# Patient Record
Sex: FEMALE | Race: WHITE
[De-identification: names, ages, dates, MRNs, and addresses within clinical notes are randomized per-mention and may not be internally consistent; named-entity substitution may affect disease eponyms.]

---

## 2017-02-17 ENCOUNTER — HOSPITAL ENCOUNTER (OUTPATIENT)
Dept: HOSPITAL 45 - C.LABPBG | Age: 82
Discharge: HOME | End: 2017-02-17
Attending: INTERNAL MEDICINE
Payer: COMMERCIAL

## 2017-02-17 DIAGNOSIS — I10: Primary | ICD-10-CM

## 2017-02-17 LAB
ANION GAP SERPL CALC-SCNC: 10 MMOL/L (ref 3–11)
BUN SERPL-MCNC: 23 MG/DL (ref 7–18)
BUN/CREAT SERPL: 18.1 (ref 10–20)
CALCIUM SERPL-MCNC: 9.1 MG/DL (ref 8.5–10.1)
CHLORIDE SERPL-SCNC: 103 MMOL/L (ref 98–107)
CO2 SERPL-SCNC: 27 MMOL/L (ref 21–32)
CREAT SERPL-MCNC: 1.3 MG/DL (ref 0.6–1.2)
GLUCOSE SERPL-MCNC: 173 MG/DL (ref 70–99)
POTASSIUM SERPL-SCNC: 3.7 MMOL/L (ref 3.5–5.1)
SODIUM SERPL-SCNC: 140 MMOL/L (ref 136–145)

## 2017-04-03 ENCOUNTER — HOSPITAL ENCOUNTER (OUTPATIENT)
Dept: HOSPITAL 45 - C.LABPBG | Age: 82
Discharge: HOME | End: 2017-04-03
Attending: UROLOGY
Payer: COMMERCIAL

## 2017-04-03 DIAGNOSIS — C67.9: ICD-10-CM

## 2017-04-03 DIAGNOSIS — N28.1: Primary | ICD-10-CM

## 2017-04-03 LAB
BUN SERPL-MCNC: 36 MG/DL (ref 7–18)
BUN/CREAT SERPL: 26 (ref 10–20)
CREAT SERPL-MCNC: 1.4 MG/DL (ref 0.6–1.2)

## 2017-04-13 ENCOUNTER — HOSPITAL ENCOUNTER (EMERGENCY)
Dept: HOSPITAL 45 - C.EDB | Age: 82
Discharge: HOME | End: 2017-04-13
Payer: COMMERCIAL

## 2017-04-13 VITALS
BODY MASS INDEX: 33.49 KG/M2 | BODY MASS INDEX: 33.49 KG/M2 | WEIGHT: 184.31 LBS | WEIGHT: 184.31 LBS | HEIGHT: 62.01 IN | HEIGHT: 62.01 IN

## 2017-04-13 VITALS — HEART RATE: 72 BPM | DIASTOLIC BLOOD PRESSURE: 83 MMHG | OXYGEN SATURATION: 97 % | SYSTOLIC BLOOD PRESSURE: 150 MMHG

## 2017-04-13 VITALS — TEMPERATURE: 97.88 F
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
## 2017-04-13 DIAGNOSIS — S70.01XA: Primary | ICD-10-CM

## 2017-04-13 DIAGNOSIS — I10: ICD-10-CM

## 2017-04-13 DIAGNOSIS — R58: ICD-10-CM

## 2017-04-13 DIAGNOSIS — S50.01XA: ICD-10-CM

## 2017-04-13 DIAGNOSIS — M16.0: ICD-10-CM

## 2017-04-13 NOTE — DIAGNOSTIC IMAGING REPORT
RIGHT PELVIS/UNILATERAL HIP 2-3VIEWS



CLINICAL HISTORY: fall

Right pain



COMPARISON: None.



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 No evidence

for acetabular protrusion. Mild degenerative sclerosis of the articular services

of the acetabulum. There is no evidence for soft tissue swelling.



IMPRESSION: Moderate degenerative change of the hips bilaterally. No acute

process.







Electronically signed by:  Celestino Castillo M.D.

4/13/2017 11:14 AM



Dictated Date/Time:  4/13/2017 11:13 AM

## 2017-04-13 NOTE — EMERGENCY ROOM VISIT NOTE
History


Report prepared by Samantha:  Susan Craig


Under the Supervision of:  Dr. Blane Branch D.O.


First contact with patient:  10:20


Chief Complaint:  LEG PAIN,LEG INJURY


Stated Complaint:  FELL, RIGHT LEG INJURY





History of Present Illness


The patient is an 84 year old female who presents to the Emergency Room with 

complaints of persistent right upper leg pain starting 9 days ago. She rates 

her discomfort as a 5.5/10 in severity. She reports that she fell down a 

concrete step onto her right side. Her arm and right upper leg is bruised. She 

thought her pain was improving, but persists, prompting her to present to the 

ED. The pain worsens when she tries to walk. She is able to bear some weight on 

it. She has no pain when she is just standing. She also reports pain in her 

right hip. She is not on any blood thinners.





   Source of History:  patient


   Onset:  9 days ago


   Position:  leg (right upper)


   Symptom Intensity:  5.5/10


   Quality:  other (pain)


   Timing:  other (persistent)


   Modifying Factors (Worsening):  other (walking)


Note:


Pt reports right hip pain.





Review of Systems


See HPI for pertinent positives & negatives. A total of 10 systems reviewed and 

were otherwise negative.





Past Medical & Surgical


Medical Problems:


(1) Hypertension








Family History


Non contributory secondary to age.





Social History


Smoking Status:  Never Smoker


Marital Status:  


Occupation Status:  retired





Current/Historical Medications


Scheduled


Amlodipine (Norvasc), 2.5 MG PO QAM


Glimepiride (Glimepiride), 1 TABLET PO QAM


Metoprolol Succinate (Toprol Xl), 50 MG PO QAM


Valsartan/Hctz (Diovan Hct 320MG/25MG), 1 TAB PO DAILY





Scheduled PRN


Tramadol (Ultram), 100 MG PO Q4H PRN for Pain





Allergies


Coded Allergies:  


     Hydrocodone (Verified  Allergy, Severe, PRURITIS, 4/13/17)


     Sulfa Antibiotics (Verified  Allergy, Unknown, "RED BLOTCHES", 4/13/17)





Physical Exam


Vital Signs











  Date Time  Temp Pulse Resp B/P Pulse Ox O2 Delivery O2 Flow Rate FiO2


 


4/13/17 12:03  72 18 150/83 97 Room Air  


 


4/13/17 10:14 36.6 81 18 166/74 97 Room Air  











Physical Exam


CONSTITUTIONAL/VITAL SIGNS: Reviewed / noted above.


GENERAL: Non-toxic in appearance. 


INTEGUMENTARY: Warm, dry, and Pink. Ecchymosis in the right proximal forearm.


HEAD: Normocephalic.


EYES: without scleral icterus or trauma.


ENT/OROPHARYNX: clear and moist.


LYMPHADENOPATHY/NECK: Is supple without lymphadenopathy or meningismus.


RESPIRATORY: Lungs clear and equal.


CARDIOVASCULAR: Regular rate and rhythm.


GI/ABDOMEN: Soft and nontender. No organomegaly or pulsatile mass. No rebound 

or guarding. Normal bowel sounds.


HIP: Right lateral hip tenderness and ecchymosis. 


EXTREMITIES: Warm and well perfused. 


BACK: No CVA tenderness.


NEUROLOGICAL: Intact without focal deficits. 


PSYCHIATRIC: normal affect.


MUSCULOSKELETAL: Normally developed with good muscle tone.





Medical Decision & Procedures


ER Provider


Diagnostic Interpretation:


X ray results and stated below per my interpretation and radiology 

interpretation.





RIGHT PELVIS/UNILATERAL HIP 2-3VIEWS





CLINICAL HISTORY: fall


Right pain





COMPARISON: None.





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 No evidence


for acetabular protrusion. Mild degenerative sclerosis of the articular services


of the acetabulum. There is no evidence for soft tissue swelling.





IMPRESSION: Moderate degenerative change of the hips bilaterally. No acute


process.





Electronically signed by:  Celestino Castillo M.D.


4/13/2017 11:14 AM





Dictated Date/Time:  4/13/2017 11:13 AM





Medications Administered











 Medications


  (Trade)  Dose


 Ordered  Sig/Feliz


 Route  Start Time


 Stop Time Status Last Admin


Dose Admin


 


 Tramadol HCl


  (Ultram Tab)  50 mg  NOW  STAT


 PO  4/13/17 12:11


 4/13/17 12:12 DC 4/13/17 12:18


50 MG











ED Course


1031: Previous medical records were reviewed. The patient was evaluated in room 

A11B. A complete history and physical examination was performed.





1215: On reevaluation, the patient is resting comfortably. I discussed the 

results and findings with the patient. She verbalized agreement of the 

treatment plan. She was discharged home.





Medical Decision


Differential includes close head injury, intracranial bleed, facial trauma, 

cervical spine trauma, chest and thoracic trauma, abdominal and intra-abdominal 

trauma, spine neurologic trauma, extremity trauma.





This is a 84-year-old female who presents to the ED with a chief complaint of 

fall 9 days ago and persistent right hip pain.  The patient states that she 

fell 9 days ago when she lost her footing going over a curb.  She fell onto her 

right elbow and right hip.  The patient continues to complain of some right hip 

discomfort with motion.  She is able to bear weight.  She denies any headaches, 

nausea or vomiting.  Her elbow does have an area of ecchymosis but this is 

improving and she has full range of motion without significant pain.  The 

patient is primarily concerned about her hip.  X-rays of the pelvis and right 

hip did not show any abnormalities with regards to acute trauma.  She does have 

an area of ecchymosis on exam in the right hip region and lateral buttock area.

  She also has ecchymosis in the right proximal forearm.  She has full range of 

motion of both injured areas.  The symptoms are likely consistent with a 

muscular bruise.  She was felt to be stable for discharge.





Impression





 Primary Impression:  


 Contusion of hip, right





Scribe Attestation


The scribe's documentation has been prepared under my direction and personally 

reviewed by me in its entirety. I confirm that the note above accurately 

reflects all work, treatment, procedures, and medical decision making performed 

by me.





Departure Information


Dispostion


Home / Self-Care





Prescriptions





Tramadol (Ultram) 50 Mg Tab


100 MG PO Q4H Y for Pain, #20 TAB


   Prov: Blane Branch D.O.         4/13/17





Referrals


No Doctor, Assigned (PCP)





Patient Instructions


Bruises Contusions, My [facility]





Additional Instructions





Ultram as prescribed for pain.





Tylenol can be used as well.





Follow-up with your doctor if symptoms persist more than 1 or 2 weeks.





Return for severe worsening or other concerns.

## 2017-04-19 ENCOUNTER — HOSPITAL ENCOUNTER (OUTPATIENT)
Dept: HOSPITAL 45 - C.RAD | Age: 82
Discharge: HOME | End: 2017-04-19
Attending: UROLOGY
Payer: COMMERCIAL

## 2017-04-19 DIAGNOSIS — N28.1: Primary | ICD-10-CM

## 2017-04-19 DIAGNOSIS — C67.9: ICD-10-CM

## 2017-04-19 NOTE — DIAGNOSTIC IMAGING REPORT
IV PYELOGRAM



CLINICAL HISTORY: History of bladder cancer.



COMPARISON STUDY: Abdominal CT dated 11/16/2015.



TECHNIQUE: An abdominal  radiograph is performed. IVP pyelogram was then

performed following the IV administration of 100 cc of Optiray 300, tomographic

images are acquired in the corticomedullary and excretory phases of enhancement.

 Overhead views of the renal collecting system and bladder were obtained in

multiple obliquities both pre and post void.



FINDINGS:



An abdominal  radiograph shows a nonobstructed abdominal bowel gas pattern.

Cholecystectomy clips are identified in the right upper quadrant. There is no

radiographic evidence of nephrolithiasis. The skeletal structures are

osteopenic. Lumbosacral spondylosis is observed. The bony pelvis appears intact.



Following contrast administration there is symmetric renal cortical enhancement

and contrast excretion. The kidneys appear atrophic. There is no hydronephrosis.

There are no filling defects identified within the renal pelvis bilaterally or

along the course of the ureters to suggest urothelial lesion.



The bladder is normal as visualized. There is only trace post void residual

contrast identified in the bladder.





IMPRESSION:  Normal IV pyelogram.  No evidence of urothelial lesion is seen

within the renal pelvis bilaterally or along the course of the ureters.







Electronically signed by:  Kevin Narayanan M.D.

4/19/2017 2:52 PM



Dictated Date/Time:  4/19/2017 2:47 PM

## 2017-04-24 ENCOUNTER — HOSPITAL ENCOUNTER (OUTPATIENT)
Dept: HOSPITAL 45 - C.LABBC | Age: 82
Discharge: HOME | End: 2017-04-24
Attending: INTERNAL MEDICINE
Payer: COMMERCIAL

## 2017-04-24 DIAGNOSIS — I12.9: ICD-10-CM

## 2017-04-24 DIAGNOSIS — N18.3: ICD-10-CM

## 2017-04-24 DIAGNOSIS — E11.29: ICD-10-CM

## 2017-04-24 DIAGNOSIS — R94.6: ICD-10-CM

## 2017-04-24 DIAGNOSIS — E78.5: Primary | ICD-10-CM

## 2017-04-24 LAB
ANION GAP SERPL CALC-SCNC: 5 MMOL/L (ref 3–11)
BASOPHILS # BLD: 0.07 K/UL (ref 0–0.2)
BASOPHILS NFR BLD: 0.9 %
BUN SERPL-MCNC: 29 MG/DL (ref 7–18)
BUN/CREAT SERPL: 22.2 (ref 10–20)
CALCIUM SERPL-MCNC: 9.5 MG/DL (ref 8.5–10.1)
CHLORIDE SERPL-SCNC: 103 MMOL/L (ref 98–107)
CO2 SERPL-SCNC: 31 MMOL/L (ref 21–32)
COMPLETE: YES
CREAT SERPL-MCNC: 1.3 MG/DL (ref 0.6–1.2)
CREAT UR-MCNC: 180 MG/DL
EOSINOPHIL NFR BLD AUTO: 403 K/UL (ref 130–400)
EST. AVERAGE GLUCOSE BLD GHB EST-MCNC: 186 MG/DL
GLUCOSE SERPL-MCNC: 220 MG/DL (ref 70–99)
HCT VFR BLD CALC: 41.5 % (ref 37–47)
IG%: 0.1 %
IMM GRANULOCYTES NFR BLD AUTO: 30.9 %
LYMPHOCYTES # BLD: 2.52 K/UL (ref 1.2–3.4)
MCH RBC QN AUTO: 29.7 PG (ref 25–34)
MCHC RBC AUTO-ENTMCNC: 33 G/DL (ref 32–36)
MCV RBC AUTO: 89.8 FL (ref 80–100)
MONOCYTES NFR BLD: 10 %
NEUTROPHILS # BLD AUTO: 4.8 %
NEUTROPHILS NFR BLD AUTO: 53.3 %
PMV BLD AUTO: 10.2 FL (ref 7.4–10.4)
POTASSIUM SERPL-SCNC: 3.9 MMOL/L (ref 3.5–5.1)
PROT UR STRIP-MCNC: 14.7 MG/DL (ref 0–11.9)
RBC # BLD AUTO: 4.62 M/UL (ref 4.2–5.4)
SODIUM SERPL-SCNC: 139 MMOL/L (ref 136–145)
TSH SERPL-ACNC: 1.9 UIU/ML (ref 0.3–4.5)
URINE PROTIEN/CREAT RATIO: 0.1 (ref 0–0.2)
WBC # BLD AUTO: 8.16 K/UL (ref 4.8–10.8)

## 2017-12-13 ENCOUNTER — HOSPITAL ENCOUNTER (INPATIENT)
Dept: HOSPITAL 45 - C.EDC | Age: 82
LOS: 1 days | Discharge: HOME | DRG: 153 | End: 2017-12-14
Attending: HOSPITALIST | Admitting: HOSPITALIST
Payer: COMMERCIAL

## 2017-12-13 VITALS
WEIGHT: 184.97 LBS | WEIGHT: 184.97 LBS | HEIGHT: 61 IN | BODY MASS INDEX: 34.92 KG/M2 | BODY MASS INDEX: 34.92 KG/M2 | BODY MASS INDEX: 34.92 KG/M2 | HEIGHT: 61 IN

## 2017-12-13 DIAGNOSIS — E11.649: ICD-10-CM

## 2017-12-13 DIAGNOSIS — Z82.49: ICD-10-CM

## 2017-12-13 DIAGNOSIS — G44.209: ICD-10-CM

## 2017-12-13 DIAGNOSIS — Z79.84: ICD-10-CM

## 2017-12-13 DIAGNOSIS — Z79.899: ICD-10-CM

## 2017-12-13 DIAGNOSIS — I10: ICD-10-CM

## 2017-12-13 DIAGNOSIS — Z83.3: ICD-10-CM

## 2017-12-13 DIAGNOSIS — F43.9: ICD-10-CM

## 2017-12-13 DIAGNOSIS — N17.9: ICD-10-CM

## 2017-12-13 DIAGNOSIS — F41.9: ICD-10-CM

## 2017-12-13 DIAGNOSIS — J11.1: Primary | ICD-10-CM

## 2017-12-13 LAB
ALP SERPL-CCNC: 68 U/L (ref 45–117)
ALT SERPL-CCNC: 20 U/L (ref 12–78)
ANION GAP SERPL CALC-SCNC: 7 MMOL/L (ref 3–11)
APPEARANCE UR: CLEAR
AST SERPL-CCNC: 17 U/L (ref 15–37)
BASOPHILS # BLD: 0.04 K/UL (ref 0–0.2)
BASOPHILS NFR BLD: 0.5 %
BILIRUB UR-MCNC: (no result) MG/DL
BUN SERPL-MCNC: 33 MG/DL (ref 7–18)
BUN/CREAT SERPL: 25.7 (ref 10–20)
CALCIUM SERPL-MCNC: 8.9 MG/DL (ref 8.5–10.1)
CHLORIDE SERPL-SCNC: 106 MMOL/L (ref 98–107)
CO2 SERPL-SCNC: 25 MMOL/L (ref 21–32)
COLOR UR: YELLOW
COMPLETE: YES
CREAT CL PREDICTED SERPL C-G-VRATE: 35 ML/MIN
CREAT SERPL-MCNC: 1.28 MG/DL (ref 0.6–1.2)
EOSINOPHIL NFR BLD AUTO: 242 K/UL (ref 130–400)
GLUCOSE SERPL-MCNC: 64 MG/DL (ref 70–99)
HCT VFR BLD CALC: 40 % (ref 37–47)
IG%: 0.2 %
IMM GRANULOCYTES NFR BLD AUTO: 26.5 %
LYMPHOCYTES # BLD: 2.2 K/UL (ref 1.2–3.4)
MANUAL MICROSCOPIC REQUIRED?: NO
MCH RBC QN AUTO: 30.3 PG (ref 25–34)
MCHC RBC AUTO-ENTMCNC: 33.8 G/DL (ref 32–36)
MCV RBC AUTO: 89.9 FL (ref 80–100)
MONOCYTES NFR BLD: 14 %
NEUTROPHILS # BLD AUTO: 2.1 %
NEUTROPHILS NFR BLD AUTO: 56.7 %
NITRITE UR QL STRIP: (no result)
PH UR STRIP: 5 [PH] (ref 4.5–7.5)
PMV BLD AUTO: 10.3 FL (ref 7.4–10.4)
POTASSIUM SERPL-SCNC: 3.8 MMOL/L (ref 3.5–5.1)
RBC # BLD AUTO: 4.45 M/UL (ref 4.2–5.4)
REVIEW REQ?: NO
SODIUM SERPL-SCNC: 138 MMOL/L (ref 136–145)
SP GR UR STRIP: 1.02 (ref 1–1.03)
URINE BILL WITH OR WITHOUT MIC: (no result)
UROBILINOGEN UR-MCNC: (no result) MG/DL
WBC # BLD AUTO: 8.29 K/UL (ref 4.8–10.8)

## 2017-12-13 NOTE — DIAGNOSTIC IMAGING REPORT
CHEST ONE VIEW PORTABLE



HISTORY:  84 years-old Female CHEST PAIN acute atypical chest pain



COMPARISON: Chest radiograph 3/20/2013



TECHNIQUE: Portable upright AP view of the chest



FINDINGS: 

Cardiac silhouette is again mildly enlarged. Chronic interstitial opacities are

redemonstrated, predominantly within a bibasilar and perihilar distribution

suggesting areas of chronic scarring. No pneumothorax, pleural effusion, focal

airspace consolidation or overt pulmonary edema. Mild right hemidiaphragmatic

elevation. Bones of the chest appear grossly intact. Severe degenerative changes

of the right shoulder.



IMPRESSION: 

1. Cardiomegaly without overt pulmonary edema.

2. Unchanged perihilar and bibasilar reticular opacities suggest areas of

chronic fibrosis. 







The above report was generated using voice recognition software. It may contain

grammatical, syntax or spelling errors.







Electronically signed by:  Raulito Sethi M.D.

12/13/2017 9:44 PM



Dictated Date/Time:  12/13/2017 9:43 PM

## 2017-12-13 NOTE — EMERGENCY ROOM VISIT NOTE
History


Report prepared by Samantha:  Lucian Vines


Under the Supervision of:  Dr. Scooter Sesay M.D.


First contact with patient:  20:31


Chief Complaint:  HEADACHE


Stated Complaint:  HEADACHE





History of Present Illness


The patient is an 84 year old female who presents to the Emergency Room with 

complaints of persistent headache for one week PTA. She notes the headache 

worsens with movement. She notes increased tiredness, nausea, vomiting, cough, 

rhinorrhea, and fevers. She notes her pain is currently a 5/10 in severity. She 

notes that she has not been feeling well over the past week. She had a migraine 

three days ago, in which she felt dizzy and shaky. She notes that her daughter 

has recently started chemotherapy and has been anxious. She denies any 

abdominal pain, shortness of breath, chest pain, rash, dysuria, syncope, leg 

pain, or leg swelling. She denies taking any blood thinners. She has a history 

of bladder cancer. She has a history of diabetes. Her blood sugar is currently 

81. Per daughter, the patient was confused earlier today and has dealt with 

confusion in the past.





   Source of History:  patient, family


   Onset:  one week PTA


   Position:  head


   Symptom Intensity:  


   Quality:  other (headache)


   Timing:  other (persistent)


   Modifying Factors (Worsening):  movement


   Associated Symptoms:  + fevers, + cough, + nausea, + vomiting, No chest pain

, No SOB, No abdominal pain, No rash


Note:


She notes increased tiredness and rhinorrhea. 


She notes a recent migraine.


She denies any dysuria, syncope, leg pain or leg swelling. 


She notes confusion.





Review of Systems


See HPI for pertinent positives & negatives. A total of 10 systems reviewed and 

were otherwise negative.





Past Medical & Surgical


Medical Problems:


(1) Bladder cancer


(2) Diabetes


(3) Heart disease


(4) Hypertension


Surgical Problems:


(1) H/O: hysterectomy


(2) History of bladder surgery








Family History





Diabetes mellitus


FH: HTN (hypertension)


FH: cancer


FH: heart disease





Social History


Smoking Status:  Never Smoker


Smokeless Tobacco Use:  No


Alcohol Use:  none


Drug Use:  none


Marital Status:  


Housing Status:  lives alone


Occupation Status:  retired





Current/Historical Medications


Scheduled


Glimepiride (Glimepiride), 2 MG PO BID


Metoprolol Succinate (Toprol Xl), 50 MG PO QAM


Valsartan/Hctz (Diovan Hct 320MG/25MG), 1 TAB PO DAILY





Allergies


Coded Allergies:  


     Hydrocodone (Verified  Allergy, Severe, PRURITIS, 4/13/17)


     Ampicillin (Verified  Allergy, Unknown, UNKNOWN, 12/13/17)


 INFO FROM [facility]


     CI Pigment Blue 63 (Verified  Allergy, Unknown, UNKNOWN, 12/13/17)


     Fesoterodine (Verified  Allergy, Unknown, UNKNOWN, 12/13/17)


     Hydrochlorothiazide (Verified  Allergy, Unknown, UNKNOWN, 12/13/17)


 INFO FROM [facility]


     Lecithin (Verified  Allergy, Unknown, UNKNOWN, 12/13/17)


     Metformin (Verified  Allergy, Unknown, UNKNOWN, 12/13/17)


 INFO FROM [facility]


     Phenazopyridine (Verified  Allergy, Unknown, UNKNOWN, 12/13/17)


 [facility]


     Sulfa Antibiotics (Verified  Allergy, Unknown, "RED BLOTCHES", 4/13/17)


     Tramadol (Verified  Allergy, Unknown, UNKNOWN, 12/13/17)





Physical Exam


Vital Signs











  Date Time  Temp Pulse Resp B/P (MAP) Pulse Ox O2 Delivery O2 Flow Rate FiO2


 


12/13/17 23:02  66 18 120/81 96 Room Air  


 


12/13/17 22:08  68 18 129/51 98 Room Air  


 


12/13/17 20:38  77      


 


12/13/17 20:30 37.4 74 18 131/65 98 Room Air  











Physical Exam


General: Non-ill appearing older female in no acute distress. 


HEENT: Normal cephalic atraumatic.  Pupils are equal round and reactive to 

light.  Extraocular movements are intact.  Oropharynx is pink with moist mucous 

membranes.  No swelling of the mouth lips or tongue. Intermittent dry cough


Neck: Supple with a midline trachea.  No meningeal signs or stiffness, no JVD 

or bruits. No Stridor.


Chest: Clear to auscultation bilaterally.  No wheezes or rhonchi.  No increased 

work of breathing.


Heart: regular rate and rhythm. 


Abdomen: Soft nontender, nondistended without rebound guarding or rigidity.  


Extremities: No cyanosis clubbing or edema. No calf tenderness or assymetry


Spine/Back. Non tender to palpation. No CVA tenderness


Skin: Good turgor without rashes.


Neurologic exam: Cranial nerves two through 12 are intact.  Motor and sensation 

are intact and symmetrical throughout. Alert and oriented x3.





Medical Decision & Procedures


ER Provider


Diagnostic Interpretation:


Radiology results as stated below per my review and radiologist interpretation:








HEAD WITHOUT CONTRAST (CT)





CLINICAL HISTORY: 84 years-old Female with headache, sinus symptoms.  Acute


headache  





TECHNIQUE: Multiple axial CT images of the head were obtained without contrast. 


A dose lowering technique was utilized adhering to the principles of ALARA.





CT DOSE:  537.48 mGy.cm





COMPARISON: None.





FINDINGS: 





No acute intracranial hemorrhage, midline shift, intracranial mass,


hydrocephalus, territorial ischemia or abnormal extra-axial collection. Moderate


atrophy with ex vacuo ventriculomegaly. Right greater than left lentiform nuclei


senescent calcifications. Ill-defined areas of low-attenuation within the


periventricular white matter suggest chronic microvascular ischemic changes.


Vascular calcifications are seen at the level of the skull base.





The calvarium is intact.  The paranasal sinuses, mastoid air cells, and middle


ear cavities are clear.





IMPRESSION:  No acute intracranial abnormality.











The above report was generated using voice recognition software. It may contain


grammatical, syntax or spelling errors.











Electronically signed by:  Raulito Sethi M.D.


12/13/2017 9:37 PM





Dictated Date/Time:  12/13/2017 9:34 PM





******** ADDENDUM ********








The hyperdensities of the bilateral lentiform nuclei, right greater than left as


noted on comparison study most likely reflect senescent calcifications. A small


superimposed intraparenchymal hematoma of the right lentiform nucleus is thought


to be less likely. As a precautionary measure, a short-term follow-up CT may be


considered.











Electronically signed by:  Raulito Sethi M.D.


12/13/2017 9:52 PM





Dictated Date/Time:  12/13/2017 9:50 PM





******** ORIGINAL REPORT ********








HEAD WITHOUT CONTRAST (CT)





CLINICAL HISTORY: 84 years-old Female with headache, sinus symptoms.  Acute


headache  





TECHNIQUE: Multiple axial CT images of the head were obtained without contrast. 


A dose lowering technique was utilized adhering to the principles of ALARA.





CT DOSE:  537.48 mGy.cm





COMPARISON: None.





FINDINGS: 





No acute intracranial hemorrhage, midline shift, intracranial mass,


hydrocephalus, territorial ischemia or abnormal extra-axial collection. Moderate


atrophy with ex vacuo ventriculomegaly. Right greater than left lentiform nuclei


senescent calcifications. Ill-defined areas of low-attenuation within the


periventricular white matter suggest chronic microvascular ischemic changes.


Vascular calcifications are seen at the level of the skull base.





The calvarium is intact.  The paranasal sinuses, mastoid air cells, and middle


ear cavities are clear.





IMPRESSION:  No acute intracranial abnormality.











The above report was generated using voice recognition software. It may contain


grammatical, syntax or spelling errors.











Electronically signed by:  Raulito Sethi M.D.


12/13/2017 9:37 PM





Dictated Date/Time:  12/13/2017 9:34 PM

















CHEST ONE VIEW PORTABLE





HISTORY:  84 years-old Female CHEST PAIN acute atypical chest pain





COMPARISON: Chest radiograph 3/20/2013





TECHNIQUE: Portable upright AP view of the chest





FINDINGS: 


Cardiac silhouette is again mildly enlarged. Chronic interstitial opacities are


redemonstrated, predominantly within a bibasilar and perihilar distribution


suggesting areas of chronic scarring. No pneumothorax, pleural effusion, focal


airspace consolidation or overt pulmonary edema. Mild right hemidiaphragmatic


elevation. Bones of the chest appear grossly intact. Severe degenerative changes


of the right shoulder.





IMPRESSION: 


1. Cardiomegaly without overt pulmonary edema.


2. Unchanged perihilar and bibasilar reticular opacities suggest areas of


chronic fibrosis. 











The above report was generated using voice recognition software. It may contain


grammatical, syntax or spelling errors.











Electronically signed by:  Raulito Sethi M.D.


12/13/2017 9:44 PM





Dictated Date/Time:  12/13/2017 9:43 PM





Laboratory Results


12/13/17 20:35








Red Blood Count 4.45, Mean Corpuscular Volume 89.9, Mean Corpuscular Hemoglobin 

30.3, Mean Corpuscular Hemoglobin Concent 33.8, Mean Platelet Volume 10.3, 

Neutrophils (%) (Auto) 56.7, Lymphocytes (%) (Auto) 26.5, Monocytes (%) (Auto) 

14.0, Eosinophils (%) (Auto) 2.1, Basophils (%) (Auto) 0.5, Neutrophils # (Auto

) 4.70, Lymphocytes # (Auto) 2.20, Monocytes # (Auto) 1.16, Eosinophils # (Auto

) 0.17, Basophils # (Auto) 0.04





12/13/17 20:35

















Test


  12/13/17


00:00 12/13/17


20:35 12/13/17


20:58 12/13/17


21:10


 


Urine Color YELLOW    


 


Urine Appearance CLEAR (CLEAR)    


 


Urine pH 5.0 (4.5-7.5)    


 


Urine Specific Gravity


  1.024


(1.000-1.030) 


  


  


 


 


Urine Protein NEG (NEG)    


 


Urine Glucose (UA) NEG (NEG)    


 


Urine Ketones NEG (NEG)    


 


Urine Occult Blood NEG (NEG)    


 


Urine Nitrite NEG (NEG)    


 


Urine Bilirubin NEG (NEG)    


 


Urine Urobilinogen NEG (NEG)    


 


Urine Leukocyte Esterase NEG (NEG)    


 


White Blood Count


  


  8.29 K/uL


(4.8-10.8) 


  


 


 


Red Blood Count


  


  4.45 M/uL


(4.2-5.4) 


  


 


 


Hemoglobin


  


  13.5 g/dL


(12.0-16.0) 


  


 


 


Hematocrit  40.0 % (37-47)   


 


Mean Corpuscular Volume


  


  89.9 fL


() 


  


 


 


Mean Corpuscular Hemoglobin


  


  30.3 pg


(25-34) 


  


 


 


Mean Corpuscular Hemoglobin


Concent 


  33.8 g/dl


(32-36) 


  


 


 


Platelet Count


  


  242 K/uL


(130-400) 


  


 


 


Mean Platelet Volume


  


  10.3 fL


(7.4-10.4) 


  


 


 


Neutrophils (%) (Auto)  56.7 %   


 


Lymphocytes (%) (Auto)  26.5 %   


 


Monocytes (%) (Auto)  14.0 %   


 


Eosinophils (%) (Auto)  2.1 %   


 


Basophils (%) (Auto)  0.5 %   


 


Neutrophils # (Auto)


  


  4.70 K/uL


(1.4-6.5) 


  


 


 


Lymphocytes # (Auto)


  


  2.20 K/uL


(1.2-3.4) 


  


 


 


Monocytes # (Auto)


  


  1.16 K/uL


(0.11-0.59) 


  


 


 


Eosinophils # (Auto)


  


  0.17 K/uL


(0-0.5) 


  


 


 


Basophils # (Auto)


  


  0.04 K/uL


(0-0.2) 


  


 


 


RDW Standard Deviation


  


  44.8 fL


(36.4-46.3) 


  


 


 


RDW Coefficient of Variation


  


  13.5 %


(11.5-14.5) 


  


 


 


Immature Granulocyte % (Auto)  0.2 %   


 


Immature Granulocyte # (Auto)


  


  0.02 K/uL


(0.00-0.02) 


  


 


 


Anion Gap


  


  7.0 mmol/L


(3-11) 


  


 


 


Est Creatinine Clear Calc


Drug Dose 


  35.0 ml/min 


  


  


 


 


Estimated GFR (


American) 


  44.5 


  


  


 


 


Estimated GFR (Non-


American 


  38.4 


  


  


 


 


BUN/Creatinine Ratio  25.7 (10-20)   


 


Calcium Level


  


  8.9 mg/dl


(8.5-10.1) 


  


 


 


Total Bilirubin


  


  0.4 mg/dl


(0.2-1) 


  


 


 


Direct Bilirubin


  


  0.1 mg/dl


(0-0.2) 


  


 


 


Aspartate Amino Transf


(AST/SGOT) 


  17 U/L (15-37) 


  


  


 


 


Alanine Aminotransferase


(ALT/SGPT) 


  20 U/L (12-78) 


  


  


 


 


Alkaline Phosphatase


  


  68 U/L


() 


  


 


 


Total Protein


  


  7.8 gm/dl


(6.4-8.2) 


  


 


 


Albumin


  


  3.6 gm/dl


(3.4-5.0) 


  


 


 


Lipase


  


  165 U/L


() 


  


 


 


Bedside Lactic Acid Venous


  


  


  1.98 mmol/L


(0.90-1.70) 


 


 


Bedside Troponin I


  


  


  


  < 0.030 ng/ml


(0-0.045)


 


Test


  12/13/17


23:00 


  


  


 


 


Bedside Glucose


  79 mg/dl


(70-90) 


  


  


 





Laboratory studies as stated above per my review.





Medications Administered











 Medications


  (Trade)  Dose


 Ordered  Sig/Feliz


 Route  Start Time


 Stop Time Status Last Admin


Dose Admin


 


 Sodium Chloride  1,000 ml @ 


 999 mls/hr  Q1H1M STAT


 IV  12/13/17 20:41


 12/13/17 21:41 DC 12/13/17 21:11


999 MLS/HR


 


 Dextrose


  (Dextrose 50%


 50ML Syringe)  25 ml  NOW  STAT


 IV  12/13/17 21:40


 12/13/17 21:41 DC 12/13/17 21:46


25 ML


 


 Dextrose  1,000 ml @ 


 100 mls/hr  Q10H STAT


 IV  12/13/17 22:08


 12/14/17 08:07  12/13/17 22:56


100 MLS/HR











ECG


Indication:  other (headache)


Rate (beats per minute):  70


Rhythm:  normal sinus


Findings:  other (low voltage, poor R wave progression, and old inferior 

infarct change)


Change:


When compared to 7/25/2013





ED Course


2032: Past medical records reviewed. The patient was evaluated in room C4, and 

a complete history and physical examination were performed.





2041: Ordered NSS 1,000 ml @ 999 mls/hr IV


 


2118: I reassessed the patient at this time. She is feeling better and resting 

comfortably.





2140: Ordered Dextrose 25 ml IV





2150: I spoke with Dr. Sethi, radiologist. We discussed the patients case 

and imaging. He believes it does not appear to be a hemorrhage. He recommends a 

CT scan in 12 hours. The patient will be further evaluated.





2209: I spoke with Dr. Bedolla, hospitalist. We discussed the patient's 

case. He recommends an MRI as soon as possible. The patient will be evaluated 

by the [facility] for further management.





Medical Decision


Differentials include, but are not limited to; influenza, PNA, sepsis, 

dehydration, intracranial process, and electrolyte or metabolic abnormality





This patient comes in as described above.  She is brought by her family after 

they feel that she has been confused at times.  She is very dizzy a couple days 

ago she's had no fall or trauma she has a cough and URI type symptoms with 

occasional headache she says her she denies headache when she moves.  She says 

she is fine and wants to go home.  She denies dysuria or chest pain or fall.  

No abdominal pain.  IV access established and she was gently hydrated.  EKG 

does not suggest acute cord syndrome or arrhythmia she has no acute electrolyte 

or metabolic abnormalities with exception of blood sugar being low in the 60s.  

She was given one half amp of IV D50 as she was kept nothing by mouth pending 

her workup.  She has nothing to suggest pneumonia .  Flu swab was pending.  She 

had a CAT scan of her head was concerned that she may have a small hemorrhage 

on the right lateral to the ventricle.  Radiologist feels that is most likely a 

asymmetrical calcification.  I do think she needs to be admitted for further 

treatment and evaluation and follow-up imaging.  Dr. Chauhan did see the 

patient ER and would like us to order MRI and also started on D5 IV as he is 

concerned about her been on long-acting hypoglycemic agent.  She was started on 

this and MRI was ordered and they will see her for further treatment and 

evaluation and admission.





Medication Reconcilliation


Current Medication List:  was personally reviewed by me





Blood Pressure Screening


Patient's blood pressure:  Elevated blood pressure


The patient will be further evaluated.





Consults


Time Called:  2145


Consulting Physician:  Dr. Sethi, radiologist.


Returned Call:  2150


I spoke with Dr. Sethi, radiologist. We discussed the patients case and 

imaging. He believes it does not appear to be a hemorrhage. He recommends a CT 

scan in 12 hours. The patient will be further evaluated.


Additional Consults:  


   Time Called:  2158


   Consulted Physician:   silverio Albarran.


   Returned Call:  2209


Additional Comments:


I spoke with silverio Albarran. We discussed the patient's case. He 

recommends an MRI as soon as possible. The patient will be evaluated by the 

[facility] for further management.





Impression





 Primary Impression:  


 Dizziness





Scribe Attestation


The scribe's documentation has been prepared under my direction and personally 

reviewed by me in its entirety. I confirm that the note above accurately 

reflects all work, treatment, procedures, and medical decision making performed 

by me.





Departure Information


Dispostion


Being Evaluated By Hospitalist





Referrals


Ori Moreira M.D. (PCP)





Patient Instructions


[facility]

## 2017-12-13 NOTE — DIAGNOSTIC IMAGING REPORT
******** ADDENDUM ********





The hyperdensities of the bilateral lentiform nuclei, right greater than left as

noted on comparison study most likely reflect senescent calcifications. A small

superimposed intraparenchymal hematoma of the right lentiform nucleus is thought

to be less likely. As a precautionary measure, a short-term follow-up CT may be

considered.







Electronically signed by:  Raulito Sethi M.D.

12/13/2017 9:52 PM



Dictated Date/Time:  12/13/2017 9:50 PM



******** ORIGINAL REPORT ********





HEAD WITHOUT CONTRAST (CT)



CLINICAL HISTORY: 84 years-old Female with headache, sinus symptoms.  Acute

headache  



TECHNIQUE: Multiple axial CT images of the head were obtained without contrast. 

A dose lowering technique was utilized adhering to the principles of ALARA.



CT DOSE:  537.48 mGy.cm



COMPARISON: None.



FINDINGS: 



No acute intracranial hemorrhage, midline shift, intracranial mass,

hydrocephalus, territorial ischemia or abnormal extra-axial collection. Moderate

atrophy with ex vacuo ventriculomegaly. Right greater than left lentiform nuclei

senescent calcifications. Ill-defined areas of low-attenuation within the

periventricular white matter suggest chronic microvascular ischemic changes.

Vascular calcifications are seen at the level of the skull base.



The calvarium is intact.  The paranasal sinuses, mastoid air cells, and middle

ear cavities are clear.



IMPRESSION:  No acute intracranial abnormality.







The above report was generated using voice recognition software. It may contain

grammatical, syntax or spelling errors.







Electronically signed by:  Raulito Sethi M.D.

12/13/2017 9:37 PM



Dictated Date/Time:  12/13/2017 9:34 PM

## 2017-12-14 VITALS
TEMPERATURE: 98.6 F | SYSTOLIC BLOOD PRESSURE: 109 MMHG | DIASTOLIC BLOOD PRESSURE: 64 MMHG | OXYGEN SATURATION: 96 % | HEART RATE: 62 BPM

## 2017-12-14 VITALS
HEART RATE: 77 BPM | SYSTOLIC BLOOD PRESSURE: 134 MMHG | DIASTOLIC BLOOD PRESSURE: 76 MMHG | OXYGEN SATURATION: 92 % | TEMPERATURE: 98.6 F

## 2017-12-14 VITALS
DIASTOLIC BLOOD PRESSURE: 64 MMHG | HEART RATE: 62 BPM | TEMPERATURE: 98.6 F | SYSTOLIC BLOOD PRESSURE: 109 MMHG | OXYGEN SATURATION: 96 %

## 2017-12-14 VITALS — HEART RATE: 72 BPM | DIASTOLIC BLOOD PRESSURE: 66 MMHG | SYSTOLIC BLOOD PRESSURE: 117 MMHG

## 2017-12-14 VITALS
OXYGEN SATURATION: 100 % | TEMPERATURE: 99.32 F | DIASTOLIC BLOOD PRESSURE: 52 MMHG | SYSTOLIC BLOOD PRESSURE: 93 MMHG | HEART RATE: 64 BPM

## 2017-12-14 LAB
FLUAV RNA SPEC QL NAA+PROBE: (no result)
FLUBV RNA SPEC QL NAA+PROBE: (no result)

## 2017-12-14 RX ADMIN — ACETAMINOPHEN PRN MG: 325 TABLET ORAL at 09:00

## 2017-12-14 RX ADMIN — INSULIN ASPART SCH UNITS: 100 INJECTION, SOLUTION INTRAVENOUS; SUBCUTANEOUS at 07:25

## 2017-12-14 RX ADMIN — ACETAMINOPHEN PRN MG: 325 TABLET ORAL at 05:30

## 2017-12-14 RX ADMIN — INSULIN ASPART SCH UNITS: 100 INJECTION, SOLUTION INTRAVENOUS; SUBCUTANEOUS at 12:10

## 2017-12-14 RX ADMIN — ACETAMINOPHEN PRN MG: 325 TABLET ORAL at 12:57

## 2017-12-14 NOTE — DIAGNOSTIC IMAGING REPORT
BRAIN COMBO



CLINICAL HISTORY: 84 years-old Female presenting with eval for dizziness,

possible bleed, elevated blood pressure, recent vertigo and nausea with

migraine, continued headache. 



TECHNIQUE: Multisequence, multiplanar MR imaging of the brain was performed

before and after the administration of intravenous contrast. IV contrast: 8.5 mL

of Gadavist.



COMPARISON: CT head performed earlier the same day.



FINDINGS: 

The examination is degraded by motion artifact limiting diagnostic sensitivity

of the exam.



Proportional ventricular and sulcal prominence, likely age-related parenchymal

volume loss. Brain parenchyma normal in appearance with preserved gray-white

differentiation. Susceptibility artifact in the region of the basal ganglia

consistent with calcification. No mass effect or midline shift. No restricted

diffusion to suggest acute ischemia. No hemorrhage. No extra-axial fluid

collection. 



T2 skull base flow voids preserved. No abnormal parenchymal enhancement.



Bone marrow signal intensity within the calvarium within normal limits.

Bilateral native lenses are absent.



IMPRESSION:  

1.  No acute intracranial pathology. No abnormal enhancement.







Electronically signed by:  Ben Conrad M.D.

12/14/2017 8:47 AM



Dictated Date/Time:  12/14/2017 7:09 AM

## 2017-12-14 NOTE — DISCHARGE INSTRUCTIONS
Discharge Instructions


Date of Service


Dec 14, 2017.





Admission


Reason for Admission:  Hypoglycemia Associated With Type 2 Diabetes Melli





Discharge


Discharge Diagnosis / Problem:  influenza





Discharge Goals


Goal(s):  Diagnostic testing, Therapeutic intervention





Activity Recommendations


Activity Limitations:  resume your previous activity





.





Instructions / Follow-Up


Instructions / Follow-Up


influenza


-it appears evident that the flu is what was making you sick.  this can 

definitely make people feel ill and take away appetite.  from there, you got 

dehydrated (which was a big part of what was making you ill) and also because 

your diabetes medication works whether you're eating or not, low sugars were 

adding to the "misery"


-fortunately as far as influenza goes, you're fairly mildly ill (even as bad as 

you feel, flu usually makes people much sicker, so fortunately in the greater 

scheme of things, this flu didn't hit you that hard) and with hydration and 

some food things are looking better.  certainly it's safe to send you home.  

because tamiflu doesn't work all that well to treat flu (it's better as a 

preventative) and because the little benefit it gets is generally only if you 

start it within 24 to maybe 48 hours of getting sick, there's no benefit to 

putting you on it at this time.


-make sure you're eating well, and drinking at least 60oz of fluid a day


-hold off on taking your sugar pill entirely - for the indefinite future.  we'

ll want you for sure to hold off on taking it at least until you're over the flu

, but given your overall level of sugar control, it's also reasonable to 

consider if you could stop it entirely.  Dr Moreira will be able to guide you 

on when/if to restart it





-as we discussed, have your daughter call her oncologist to talk about flu 

prevention medications.





Current Hospital Diet


Patient's current hospital diet: Regular Diet





Discharge Diet


Recommended Diet:  Regular Diet





Pending Studies


Studies pending at discharge:  no





Laboratory Results





Hemoglobin A1c








Test


  10/31/17


10:57 Range/Units


 


 


Estimated Average Glucose 160   mg/dl


 


Hemoglobin A1c 7.2 H 4.5-5.6  %








Lipid Panel








Test


  10/31/17


10:57 Range/Units


 


 


Triglycerides Level 117  0-150  mg/dl


 


Cholesterol Level 181  0-200  mg/dl


 


HDL Cholesterol 43   mg/dl


 


Cholesterol/HDL Ratio 4.2   


 


LDL Cholesterol, Calculated 115   mg/dl











Medical Emergencies








.


Who to Call and When:





Medical Emergencies:  If at any time you feel your situation is an emergency, 

please call 911 immediately.





.





Non-Emergent Contact


Non-Emergency issues call your:  Primary Care Provider





.


.








"Provider Documentation" section prepared by Mikie Olea.








.





VTE Core Measure


Inpt VTE Proph given/why not?:  SCD's

## 2017-12-14 NOTE — DISCHARGE SUMMARY
Discharge Summary


Date of Service


Dec 14, 2017.





Discharge Summary


Admission Date:


Dec 14, 2017 at 02:23


Discharge Date:  Dec 14, 2017


Discharge Disposition:  Home


Principal Diagnosis:  influenza


Immunizations:  


   Have You Had Influenza Vaccine:  Unknown


   History of Tetanus Vaccine?:  Unknown


   History of Pneumococcal:  Unknown


   History of Hepatitis B Vaccine:  Unknown


Procedures:


flu (+)


----------------------------------------------------





CHEST ONE VIEW PORTABLE





HISTORY:  84 years-old Female CHEST PAIN acute atypical chest pain





COMPARISON: Chest radiograph 3/20/2013





TECHNIQUE: Portable upright AP view of the chest





FINDINGS: 


Cardiac silhouette is again mildly enlarged. Chronic interstitial opacities are


redemonstrated, predominantly within a bibasilar and perihilar distribution


suggesting areas of chronic scarring. No pneumothorax, pleural effusion, focal


airspace consolidation or overt pulmonary edema. Mild right hemidiaphragmatic


elevation. Bones of the chest appear grossly intact. Severe degenerative changes


of the right shoulder.





IMPRESSION: 


1. Cardiomegaly without overt pulmonary edema.


2. Unchanged perihilar and bibasilar reticular opacities suggest areas of


chronic fibrosis. 











The above report was generated using voice recognition software. It may contain


grammatical, syntax or spelling errors.











Electronically signed by:  Raulito Sethi M.D.


12/13/2017 9:44 PM


-------------------------------------------





[~ rep ct add3]]








******** ADDENDUM ********








The hyperdensities of the bilateral lentiform nuclei, right greater than left as


noted on comparison study most likely reflect senescent calcifications. A small


superimposed intraparenchymal hematoma of the right lentiform nucleus is thought


to be less likely. As a precautionary measure, a short-term follow-up CT may be


considered.











Electronically signed by:  Raulito Sethi M.D.


12/13/2017 9:52 PM





Dictated Date/Time:  12/13/2017 9:50 PM





******** ORIGINAL REPORT ********








HEAD WITHOUT CONTRAST (CT)





CLINICAL HISTORY: 84 years-old Female with headache, sinus symptoms.  Acute


headache  





TECHNIQUE: Multiple axial CT images of the head were obtained without contrast. 


A dose lowering technique was utilized adhering to the principles of ALARA.





CT DOSE:  537.48 mGy.cm





COMPARISON: None.





FINDINGS: 





No acute intracranial hemorrhage, midline shift, intracranial mass,


hydrocephalus, territorial ischemia or abnormal extra-axial collection. Moderate


atrophy with ex vacuo ventriculomegaly. Right greater than left lentiform nuclei


senescent calcifications. Ill-defined areas of low-attenuation within the


periventricular white matter suggest chronic microvascular ischemic changes.


Vascular calcifications are seen at the level of the skull base.





The calvarium is intact.  The paranasal sinuses, mastoid air cells, and middle


ear cavities are clear.





IMPRESSION:  No acute intracranial abnormality.











The above report was generated using voice recognition software. It may contain


grammatical, syntax or spelling errors.











Electronically signed by:  Raulito Sethi M.D.


12/13/2017 9:37 PM





Dictated Date/Time:  12/13/2017 9:34 PM


--------------------------------------





[~ rep ct add3]]


BRAIN COMBO





CLINICAL HISTORY: 84 years-old Female presenting with eval for dizziness,


possible bleed, elevated blood pressure, recent vertigo and nausea with


migraine, continued headache. 





TECHNIQUE: Multisequence, multiplanar MR imaging of the brain was performed


before and after the administration of intravenous contrast. IV contrast: 8.5 mL


of Gadavist.





COMPARISON: CT head performed earlier the same day.





FINDINGS: 


The examination is degraded by motion artifact limiting diagnostic sensitivity


of the exam.





Proportional ventricular and sulcal prominence, likely age-related parenchymal


volume loss. Brain parenchyma normal in appearance with preserved gray-white


differentiation. Susceptibility artifact in the region of the basal ganglia


consistent with calcification. No mass effect or midline shift. No restricted


diffusion to suggest acute ischemia. No hemorrhage. No extra-axial fluid


collection. 





T2 skull base flow voids preserved. No abnormal parenchymal enhancement.





Bone marrow signal intensity within the calvarium within normal limits.


Bilateral native lenses are absent.





IMPRESSION:  


1.  No acute intracranial pathology. No abnormal enhancement.











Electronically signed by:  Ben Conrad M.D.


12/14/2017 8:47 AM





Dictated Date/Time:  12/14/2017 7:09 AM


-----------------------------





Last Resulted CBC


12/13/17 20:35








Red Blood Count 4.45, Mean Corpuscular Volume 89.9, Mean Corpuscular Hemoglobin 

30.3, Mean Corpuscular Hemoglobin Concent 33.8, Mean Platelet Volume 10.3, 

Neutrophils (%) (Auto) 56.7, Lymphocytes (%) (Auto) 26.5, Monocytes (%) (Auto) 

14.0, Eosinophils (%) (Auto) 2.1, Basophils (%) (Auto) 0.5, Neutrophils # (Auto

) 4.70, Lymphocytes # (Auto) 2.20, Monocytes # (Auto) 1.16, Eosinophils # (Auto

) 0.17, Basophils # (Auto) 0.04





Last Resulted BMP


12/13/17 20:35











Medication Reconciliation


New Medications:  


Hydrocodone W/ Homatropine (Hycodan 5/1.5MG 5 Ml) 1 Syp Syp


5 ML PO HS PRN for Cough for 24 Days, #120 ML





 


Continued Medications:  


Metoprolol Succinate (Toprol Xl) 50 Mg Tab


50 MG PO QAM





Valsartan/Hctz (Diovan Hct 320MG/25MG) 1 Tab Tab


1 TAB PO DAILY





 


Discontinued Medications:  


Glimepiride (Glimepiride) 2 Mg Tab


2 MG PO BID for 90 Days, #180 TAB 3 Refills











Discharge Exam


Physical Exam:  


   General Appearance:  no apparent distress


   Eyes:  EOMI


   ENT:  hearing grossly normal, + pertinent finding (suboccipitals high tone/

tender/decreased ROM - inhibitory pressure somewhat improved)


   Neck:  trachea midline


   Respiratory/Chest:  no respiratory distress, no accessory muscle use


   Neurologic/Psychiatric:  CNs II-XII nml as tested, alert, normal mood/affect


   Skin:  normal color, warm/dry





Hospital Course


weakness/respiratory symptoms/tension headache/hypoglycemia/VIKTOR due to 

dehydration


-inciting illness is influenza.  too far into the course for her to benefit 

from antivirals, but checked swab due to dtr w cancer/chemo and rec'd she 

contact her oncologist regarding exposure


-safe/stable for home


-gentle OMT as above for tension headache


-fluids, supportive care (creatinine has improved)


-stop sulfonylurea at least for now, probably indefinitely (discretion of PCP)


-stable for home


Total Time Spent:  Less than 30 minutes


This includes examination of the patient, discharge planning, medication 

reconciliation, and communication with other providers.





Discharge Instructions


Please refer to the electronic Patient Visit Report (Discharge Instructions) 

for additional information.





Additional Copies To


Ori Moreira M.D.

## 2017-12-14 NOTE — HISTORY AND PHYSICAL
History & Physical


Date & Time of Service:


Dec 14, 2017 at 02:29


Chief Complaint:


Headache


Primary Care Physician:


Ori Moreira M.D.


History of Present Illness


Source:  patient, family, hospital records


The patient is an 84-year-old female who presents to emergency department with 

persistent headache, fatigue, nausea, vomiting, cough, rhinorrhea and elevated 

temperatures for the past week prior to arrival.  She lives by herself, and her 

family had been away over the past week, and when they returned, noticed that 

she was not her usual self, and brought her to the emergency department for 

assessment.  She has been under more stress recently since her daughter began 

chemotherapy for cancer.  She does have a history of diabetes, and has been 

taking glimepiride 2 mg twice a day as directed.  Overall, her oral intake for 

both liquids and solids has been decreased over the past week.





Past Medical/Surgical History


Medical Problems:


(1) Bladder cancer


Status: Chronic  





(2) Diabetes


Status: Chronic  





(3) Heart disease


Status: Chronic  





(4) Hypertension


Status: Chronic  





Surgical Problems:


(1) H/O: hysterectomy


Status: Resolved  





(2) History of bladder surgery


Status: Resolved  











Family History





Diabetes mellitus


FH: HTN (hypertension)


FH: cancer


FH: heart disease





Social History


Smoking Status:  Never Smoker


Smokeless Tobacco Use:  No


Alcohol Use:  none


Drug Use:  none


Marital Status:  


Housing status:  lives alone


Occupational Status:  retired





Immunizations


History of Influenza Vaccine:  Unknown


History of Tetanus Vaccine?:  Unknown


History of Pneumococcal:  Unknown


History of Hepatitis B Vaccine:  Unknown





Multi-Drug Resistant Organisms


History of MDRO:  No





Allergies


Coded Allergies:  


     Hydrocodone (Verified  Allergy, Severe, PRURITIS, 4/13/17)


     Ampicillin (Verified  Allergy, Unknown, UNKNOWN, 12/13/17)


 INFO FROM [facility] [place]


     CI Pigment Blue 63 (Verified  Allergy, Unknown, UNKNOWN, 12/13/17)


     Fesoterodine (Verified  Allergy, Unknown, UNKNOWN, 12/13/17)


     Hydrochlorothiazide (Verified  Allergy, Unknown, UNKNOWN, 12/13/17)


 INFO FROM [facility] [place]


     Lecithin (Verified  Allergy, Unknown, UNKNOWN, 12/13/17)


     Metformin (Verified  Allergy, Unknown, UNKNOWN, 12/13/17)


 INFO FROM [facility] [place]


     Phenazopyridine (Verified  Allergy, Unknown, UNKNOWN, 12/13/17)


 [facility] [place]


     Sulfa Antibiotics (Verified  Allergy, Unknown, "RED BLOTCHES", 4/13/17)


     Tramadol (Verified  Allergy, Unknown, UNKNOWN, 12/13/17)





Home Medications


Scheduled


Glimepiride (Glimepiride), 2 MG PO BID


Metoprolol Succinate (Toprol Xl), 50 MG PO QAM


Valsartan/Hctz (Diovan Hct 320MG/25MG), 1 TAB PO DAILY





Review of Systems


The patient denies chest pain, palpitations, lower extremity swelling, vision 

change, hearing change, 


chills, sweats, diarrhea or constipation, abdominal pain, pelvic pain, blood in 

urine or stool, dysuria, urinary frequency or urgency, memory loss, 


loss of consciousness, rash, abnormal bruising or bleeding,imbalance, focal 

weakness, numbness or tingling in arms or legs, 


generalized arthralgias or myalgias, back or neck pain, or night sweats.


The review of systems is otherwise negative other than for that already noted 

above, and at least 10 systems have been reviewed.





Physical Exam


Vital Signs











  Date Time  Temp Pulse Resp B/P (MAP) Pulse Ox O2 Delivery O2 Flow Rate FiO2


 


12/14/17 02:23  74 18 101/48 95 Room Air  


 


12/14/17 01:15  74      


 


12/14/17 01:00  84 18 122/62 94 Room Air  


 


12/13/17 23:02  66 18 120/81 96 Room Air  


 


12/13/17 22:08  68 18 129/51 98 Room Air  


 


12/13/17 20:38  77      


 


12/13/17 20:30 37.4 74 18 131/65 98 Room Air  








The patient is awake, well-developed and adequately nourished, alert and 

oriented 3, normocephalic and atraumatic, lying in bed and in no acute 

distress.


HEENT--PERRL, EOMI, mucous membranes  and oropharynx dry.


Neck--supple, no JVD or bruits, thyroid normal, trachea midline, no adenopathy.


Heart--normal S1 and S2, no extra beats, no murmurs, rubs or gallops.


Lungs--clear bilaterally with good air movement, no respiratory distress, no 

accessory muscle use.


Abdomen--normal bowel sounds and soft, nontender and nondistended, no hernias 

or masses,  no organomegaly.


Extremities--no cyanosis, clubbing or edema. There are good distal pulses b/l.


Dermatologic--normal skin turgor, normal color, warm and dry, no abnormal lymph 

nodes, no rash.


Neurologic--cranial nerves II through XII grossly intact.


Rheumatologic--normal range of motion.


Psychiatric--normal affect.





Diagnostics


Laboratory Results





Results Past 24 Hours








Test


  12/13/17


20:35 12/13/17


20:58 12/13/17


21:10 12/13/17


23:00 Range/Units


 


 


White Blood Count 8.29    4.8-10.8  K/uL


 


Red Blood Count 4.45    4.2-5.4  M/uL


 


Hemoglobin 13.5    12.0-16.0  g/dL


 


Hematocrit 40.0    37-47  %


 


Mean Corpuscular Volume 89.9      fL


 


Mean Corpuscular Hemoglobin 30.3    25-34  pg


 


Mean Corpuscular Hemoglobin


Concent 33.8


  


  


  


  32-36  g/dl


 


 


Platelet Count 242    130-400  K/uL


 


Mean Platelet Volume 10.3    7.4-10.4  fL


 


Neutrophils (%) (Auto) 56.7     %


 


Lymphocytes (%) (Auto) 26.5     %


 


Monocytes (%) (Auto) 14.0     %


 


Eosinophils (%) (Auto) 2.1     %


 


Basophils (%) (Auto) 0.5     %


 


Neutrophils # (Auto) 4.70    1.4-6.5  K/uL


 


Lymphocytes # (Auto) 2.20    1.2-3.4  K/uL


 


Monocytes # (Auto) 1.16    0.11-0.59  K/uL


 


Eosinophils # (Auto) 0.17    0-0.5  K/uL


 


Basophils # (Auto) 0.04    0-0.2  K/uL


 


RDW Standard Deviation 44.8    36.4-46.3  fL


 


RDW Coefficient of Variation 13.5    11.5-14.5  %


 


Immature Granulocyte % (Auto) 0.2     %


 


Immature Granulocyte # (Auto) 0.02    0.00-0.02  K/uL


 


Sodium Level 138    136-145  mmol/L


 


Potassium Level 3.8    3.5-5.1  mmol/L


 


Chloride Level 106      mmol/L


 


Carbon Dioxide Level 25    21-32  mmol/L


 


Anion Gap 7.0    3-11  mmol/L


 


Blood Urea Nitrogen 33    7-18  mg/dl


 


Creatinine


  1.28


  


  


  


  0.60-1.20


mg/dl


 


Est Creatinine Clear Calc


Drug Dose 35.0


  


  


  


   ml/min


 


 


Estimated GFR (


American) 44.5


  


  


  


   


 


 


Estimated GFR (Non-


American 38.4


  


  


  


   


 


 


BUN/Creatinine Ratio 25.7    10-20  


 


Random Glucose 64    70-99  mg/dl


 


Calcium Level 8.9    8.5-10.1  mg/dl


 


Total Bilirubin 0.4    0.2-1  mg/dl


 


Direct Bilirubin 0.1    0-0.2  mg/dl


 


Aspartate Amino Transf


(AST/SGOT) 17


  


  


  


  15-37  U/L


 


 


Alanine Aminotransferase


(ALT/SGPT) 20


  


  


  


  12-78  U/L


 


 


Alkaline Phosphatase 68      U/L


 


Total Protein 7.8    6.4-8.2  gm/dl


 


Albumin 3.6    3.4-5.0  gm/dl


 


Lipase 165      U/L


 


Bedside Lactic Acid Venous


  


  1.98


  


  


  0.90-1.70


mmol/L


 


Bedside Troponin I   < 0.030  0-0.045  ng/ml


 


Bedside Glucose    79 70-90  mg/dl


 


Test


  12/14/17


00:55 12/14/17


01:17 12/14/17


01:34 12/14/17


01:47 Range/Units


 


 


Bedside Glucose 58 69 87 119 70-90  mg/dl


 


Test


  12/14/17


02:19 


  


  


  Range/Units


 


 


Bedside Glucose 138    70-90  mg/dl








Microbiology Results


12/13/17 Blood Culture, Received


           Pending


12/13/17 Blood Culture, Received


           Pending





Diagnostic Radiology


                                                                               

                                                                 


Patient Name: FARZAD VIRGEN                               Unit Number:  

[id number]                  


 





 











Dictated: 12/13/17 2150


 


Transcribed: 12/13/17 2150


 


[facility]ast[facility]


 


Printed Date/Time: [~ rep prt dt]/[~ rep prt tm]








 [~ rep ct labl] - [~ rep ct ivnm]


 





   [facility]


 Radiology Department


 [place] [address] [phone]





 











Dictated: 12/13/17 2150


 


Transcribed: 12/13/17 2150


 


JRB


 


Printed Date/Time: [~ rep prt dt]/[~ rep prt tm]








 [~ rep ct labl] - [~ rep ct ivnm]


 








[~ rep ct add3]]








******** ADDENDUM ********








The hyperdensities of the bilateral lentiform nuclei, right greater than left as


noted on comparison study most likely reflect senescent calcifications. A small


superimposed intraparenchymal hematoma of the right lentiform nucleus is thought


to be less likely. As a precautionary measure, a short-term follow-up CT may be


considered.











Electronically signed by:  Raulito Sethi M.D.


12/13/2017 9:52 PM





Dictated Date/Time:  12/13/2017 9:50 PM





******** ORIGINAL REPORT ********








HEAD WITHOUT CONTRAST (CT)





CLINICAL HISTORY: 84 years-old Female with headache, sinus symptoms.  Acute


headache  





TECHNIQUE: Multiple axial CT images of the head were obtained without contrast. 


A dose lowering technique was utilized adhering to the principles of ALARA.





CT DOSE:  537.48 mGy.cm





COMPARISON: None.





FINDINGS: 





No acute intracranial hemorrhage, midline shift, intracranial mass,


hydrocephalus, territorial ischemia or abnormal extra-axial collection. Moderate


atrophy with ex vacuo ventriculomegaly. Right greater than left lentiform nuclei


senescent calcifications. Ill-defined areas of low-attenuation within the


periventricular white matter suggest chronic microvascular ischemic changes.


Vascular calcifications are seen at the level of the skull base.





The calvarium is intact.  The paranasal sinuses, mastoid air cells, and middle


ear cavities are clear.





IMPRESSION:  No acute intracranial abnormality.











The above report was generated using voice recognition software. It may contain


grammatical, syntax or spelling errors.











Electronically signed by:  Raulito Sethi M.D.


12/13/2017 9:37 PM





Dictated Date/Time:  12/13/2017 9:34 PM





 The status of this report is Signed.   


 Draft = Not yet reviewed or approved by Radiologist.  


 Signed = Reviewed and approved by Radiologist.


<AttendingPhy></AttendingPhy> <FamilyPhy>Ori Moreira M.D.</FamilyPhy> <

PrimaryPhy>Ori Moreira M.D.</PrimaryPhy> <UnitNumber>[id number]</UnitNumber

> <VisitNumber>[id number]</VisitNumber> <PatientName>TOMMYROMARIOFARZAD</

PatientName> <DateOfBirth>1933</DateOfBirth> <Location>[facility]</Location> <

ServiceDate>12/13/17</ServiceDate> <MNE>ESINDI</MNE> <OrderingPhy>Scooter Sesay M.D.</OrderingPhy> <OrderingPhyMNE>f rep ord dr capps</OrderingPhyMNE> <

DictatingPhyMNE>f rep dict dr capps</DictatingPhyMNE> <CCListMNE>f rep ct mne</

CCListMNE> <AdmittingPhyMNE>f pt admit dr capps</AdmittingPhyMNE> <AttendingPhyMNE

>f pt attend dr capps</AttendingPhyMNE>


<ConsultingPhyMNE>f pt consult dr [facility]apps</ConsultingPhyMNE> <FamilyPhyMNE>f pt fam 

dr capps</FamilyPhyMNE> <OtherPhyMNE>f pt other dr capps</OtherPhyMNE> <

PrimaryPhyMNE>f pt prim care dr capps</PrimaryPhyMNE> <ReferringPhyMNE>f pt 

referring dr capps</ReferringPhyMNE>


                                                                               

                                                                 


Patient Name: FARZAD VIRGEN                               Unit Number:  

[id number]                  


 





 











Dictated: 12/13/17 2143


 


Transcribed: 12/13/17 2143


 


[facility]


 


Printed Date/Time: [~ rep prt dt]/[~ rep prt tm]








 [~ rep ct labl] - [~ rep ct ivnm]


 





   [facility]


 Radiology Department


 [place], [place] [id number] [phone]





 











Dictated: 12/13/17 2143


 


Transcribed: 12/13/17 2143


 


JR


 


Printed Date/Time: [~ rep prt dt]/[~ rep prt tm]








 [~ rep ct labl] - [~ rep ct ivnm]


 








CHEST ONE VIEW PORTABLE





HISTORY:  84 years-old Female CHEST PAIN acute atypical chest pain





COMPARISON: Chest radiograph 3/20/2013





TECHNIQUE: Portable upright AP view of the chest





FINDINGS: 


Cardiac silhouette is again mildly enlarged. Chronic interstitial opacities are


redemonstrated, predominantly within a bibasilar and perihilar distribution


suggesting areas of chronic scarring. No pneumothorax, pleural effusion, focal


airspace consolidation or overt pulmonary edema. Mild right hemidiaphragmatic


elevation. Bones of the chest appear grossly intact. Severe degenerative changes


of the right shoulder.





IMPRESSION: 


1. Cardiomegaly without overt pulmonary edema.


2. Unchanged perihilar and bibasilar reticular opacities suggest areas of


chronic fibrosis. 











The above report was generated using voice recognition software. It may contain


grammatical, syntax or spelling errors.











Electronically signed by:  Raulito Sethi M.D.


12/13/2017 9:44 PM





Dictated Date/Time:  12/13/2017 9:43 PM





 The status of this report is Signed.   


 Draft = Not yet reviewed or approved by Radiologist.  


 Signed = Reviewed and approved by Radiologist.


<AttendingPhy></AttendingPhy> <FamilyPhy>Ori Moreira M.D.</FamilyPhy> <

PrimaryPhy>Ori Moreira M.D.</PrimaryPhy> <UnitNumber>[id number]</UnitNumber

> <VisitNumber>[id number]</VisitNumber> <PatientName>FARZAD VIRGEN</

PatientName> <DateOfBirth>1933</DateOfBirth> <Location>[facility]</Location> <

ServiceDate>12/13/17</ServiceDate> <MNE>ESINDI</MNE> <OrderingPhy>Scooter Sesay M.D.</OrderingPhy> <OrderingPhyMNE>f rep ord dr capps</OrderingPhyMNE> <

DictatingPhyMNE>f rep dict dr capps</DictatingPhyMNE> <CCListMNE>f rep ct génesis</

CCListMNE> <AdmittingPhyMNE>f pt admit dr capps</AdmittingPhyMNE> <AttendingPhyMNE

>f pt attend dr capps</AttendingPhyMNE>


<ConsultingPhyMNE>f pt consult dr capps</ConsultingPhyMNE> <FamilyPhyMNE>f pt fam 

dr capps</FamilyPhyMNE> <OtherPhyMNE>f pt other dr capps</OtherPhyMNE> <

PrimaryPhyMNE>f pt prim care dr capps</PrimaryPhyMNE> <ReferringPhyMNE>f pt 

referring dr capps</ReferringPhyMNE>





Impression


Assessment and Plan


Persistent hypoglycemia/diabetes mellitus--


Patient has had overall decreased oral intake, and has continued to take her 

glimepiride.


Hold glimepiride 2 mg by mouth twice a day.


D5 normal saline with KCl 20 mEq at 100 mils per hour.


Regular diet


Accu-Cheks before meals and at bedtime with target blood sugar 150-200.


Once her blood sugar is maintained in that range for a few hours, will stop IV 

fluids, and ultimately place her back onto her usual diabetic diet.


Would avoid sulfonylureas in this patient in the future.





Headache--


Likely due to combination of hypoglycemia and dehydration





Hypertension/dehydration--


Hold losartan/HCTZ.


Continue metoprolol succinate but change from 50 mg every morning to 25 mg by 

mouth twice a day with hold parameters.


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 

ultimately need to be changed over to calcium channel blocker such as Cardizem.


Admits to the telemetry unit for close monitoring.





Upper respiratory infection/sore throat--


May have contributed to overall decreased oral intake over the past week.


Place on ceftriaxone 1 g IV daily.





Anxiety/stress--


Hold off on any medications at this time.


Dealing with stress of her daughter with cancer


Her daughters present in the emergency department were advised to watch the 

patient closely when she is discharged for adequate intake.





Level of Care


Telemetry





Advanced Directives


Existing Advance Directive:  No


Existing Living Will:  No


Existing Power of :  No





VTE Prophylaxis


VTE Risk Assessment Done? Y/N:  Yes


Risk Level:  Moderate


Given or contraindicated:  SCD's





Social Service Consult


>80 yr.& Lives Alone

## 2017-12-22 ENCOUNTER — HOSPITAL ENCOUNTER (OUTPATIENT)
Dept: HOSPITAL 45 - C.RADBC | Age: 82
Discharge: HOME | End: 2017-12-22
Attending: INTERNAL MEDICINE
Payer: COMMERCIAL

## 2017-12-22 DIAGNOSIS — M54.5: Primary | ICD-10-CM

## 2017-12-22 DIAGNOSIS — R93.7: ICD-10-CM

## 2017-12-22 NOTE — DIAGNOSTIC IMAGING REPORT
L-SPINE MIN 4 VIEWS ROUTINE



CLINICAL HISTORY: M54.9 Back pain   



COMPARISON STUDY:  Lumbar spine 8/30/2011.



FINDINGS: Cholecystectomy. The sacrum appears intact. Mild superior endplate

compression deformity at L2. This demonstrates approximately 10% loss of height

anteriorly. This is new from the prior study but especially age indeterminate.

No subluxation. Small endplate osteophytes seen throughout the lumbar spine.

Mild facet degenerative changes within the lower lumbar spine. Disc spaces are

relatively preserved for age.



IMPRESSION:  Age-indeterminate mild superior endplate compression deformity at

L2. 





 







Electronically signed by:  Kurt Whitmore M.D.

12/22/2017 3:08 PM



Dictated Date/Time:  12/22/2017 3:01 PM

## 2018-02-01 ENCOUNTER — HOSPITAL ENCOUNTER (OUTPATIENT)
Dept: HOSPITAL 45 - C.MAMM | Age: 83
Discharge: HOME | End: 2018-02-01
Attending: INTERNAL MEDICINE
Payer: COMMERCIAL
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
## 2018-02-01 DIAGNOSIS — S32.000A: Primary | ICD-10-CM

## 2018-02-01 DIAGNOSIS — M85.852: ICD-10-CM

## 2018-05-01 ENCOUNTER — HOSPITAL ENCOUNTER (OUTPATIENT)
Dept: HOSPITAL 45 - C.LABPBG | Age: 83
Discharge: HOME | End: 2018-05-01
Attending: INTERNAL MEDICINE
Payer: COMMERCIAL

## 2018-05-01 DIAGNOSIS — M25.512: ICD-10-CM

## 2018-05-01 DIAGNOSIS — I12.9: Primary | ICD-10-CM

## 2018-05-01 DIAGNOSIS — N18.3: ICD-10-CM

## 2018-05-01 DIAGNOSIS — E55.9: ICD-10-CM

## 2018-05-01 DIAGNOSIS — R94.6: ICD-10-CM

## 2018-05-01 DIAGNOSIS — M54.16: ICD-10-CM

## 2018-05-01 DIAGNOSIS — E78.5: ICD-10-CM

## 2018-05-01 LAB
ALBUMIN SERPL-MCNC: 3.8 GM/DL (ref 3.4–5)
ALP SERPL-CCNC: 65 U/L (ref 45–117)
ALT SERPL-CCNC: 20 U/L (ref 12–78)
AST SERPL-CCNC: 18 U/L (ref 15–37)
BASOPHILS # BLD: 0.05 K/UL (ref 0–0.2)
BASOPHILS NFR BLD: 0.5 %
BUN SERPL-MCNC: 36 MG/DL (ref 7–18)
CALCIUM SERPL-MCNC: 8.9 MG/DL (ref 8.5–10.1)
CO2 SERPL-SCNC: 25 MMOL/L (ref 21–32)
CREAT SERPL-MCNC: 1.21 MG/DL (ref 0.6–1.2)
EOS ABS #: 0.36 K/UL (ref 0–0.5)
EOSINOPHIL NFR BLD AUTO: 295 K/UL (ref 130–400)
GLUCOSE SERPL-MCNC: 65 MG/DL (ref 70–99)
HCT VFR BLD CALC: 39.5 % (ref 37–47)
HGB BLD-MCNC: 13.5 G/DL (ref 12–16)
IG#: 0.02 K/UL (ref 0–0.02)
IMM GRANULOCYTES NFR BLD AUTO: 39.3 %
KETONES UR QL STRIP: 91 MG/DL
LYMPHOCYTES # BLD: 3.58 K/UL (ref 1.2–3.4)
MCH RBC QN AUTO: 29.8 PG (ref 25–34)
MCHC RBC AUTO-ENTMCNC: 34.2 G/DL (ref 32–36)
MCV RBC AUTO: 87.2 FL (ref 80–100)
MONO ABS #: 0.7 K/UL (ref 0.11–0.59)
MONOCYTES NFR BLD: 7.7 %
NEUT ABS #: 4.4 K/UL (ref 1.4–6.5)
NEUTROPHILS # BLD AUTO: 4 %
NEUTROPHILS NFR BLD AUTO: 48.3 %
PH UR: 173 MG/DL (ref 0–200)
PMV BLD AUTO: 10 FL (ref 7.4–10.4)
POTASSIUM SERPL-SCNC: 4 MMOL/L (ref 3.5–5.1)
PROT SERPL-MCNC: 7.8 GM/DL (ref 6.4–8.2)
RED CELL DISTRIBUTION WIDTH CV: 13.8 % (ref 11.5–14.5)
RED CELL DISTRIBUTION WIDTH SD: 44.2 FL (ref 36.4–46.3)
SODIUM SERPL-SCNC: 138 MMOL/L (ref 136–145)
WBC # BLD AUTO: 9.11 K/UL (ref 4.8–10.8)

## 2018-05-02 LAB — HBA1C MFR BLD: 7.6 % (ref 4.5–5.6)
